# Patient Record
(demographics unavailable — no encounter records)

---

## 2025-01-21 NOTE — HISTORY OF PRESENT ILLNESS
[Patient reported PAP Smear was normal] : Patient reported PAP Smear was normal [Y] : Patient is sexually active [N] : Patient denies prior pregnancies [Currently Active] : currently active [Men] : men [No] : No [Yes] : Yes [FreeTextEntry1] : 29 y/o G0 presenting for pelvic pain and irregular bleeding. Pt has mirena IUD that was placed 2021. Reports initially had no sx but in last year has started to have cramping and bleeding and in last week has had pain with any movement of pelvis. Reports is having spotting between cycles and also has longer cycles than she previously had.   GYNHx: Denies STIs/fibroids/cysts/polyps/abnormal paps. Sexually active with men. Feels safe in current relationship. Last Pap 1 yr ago always normal per pt    OBHx: G0 PMH: Acne PSH: [place] tooth removal SocHx: Social alcohol use, Denies drug and tobacco use. Meds: Spironolactone 150 mg qd, topical tretinoin and clindamycin Allergies: NKDA [PapSmeardate] : 2024 [TextBox_102] : IUD [LMPDate] : 01/01/24 [TextBox_6] : 01/01/24 [TextBox_9] : 13 [FreeTextEntry3] : IUD Mirena

## 2025-01-21 NOTE — PHYSICAL EXAM
[Chaperone Present] : A chaperone was present in the examining room during all aspects of the physical examination [[id number]] : A chaperone was present during the pelvic exam. [FreeTextEntry2] : Samanta Dougherty [Appropriately responsive] : appropriately responsive [Alert] : alert [No Acute Distress] : no acute distress [Soft] : soft [Non-tender] : non-tender [Non-distended] : non-distended [Oriented x3] : oriented x3 [FreeTextEntry4] : Clinically well perfused [Labia Majora] : normal [Labia Minora] : normal [Normal] : normal [Uterine Adnexae] : normal [FreeTextEntry5] : IUD strings visualized

## 2025-01-21 NOTE — PROCEDURE
[Pelvic Pain] : pelvic pain [Locate IUD] : locate IUD [Transvaginal Ultrasound] : transvaginal ultrasound [FreeTextEntry4] : Normal sized uterus with IUD at fundus in correct location. No free fluid. Small structure at fundus that could represent small 2 cm fibroid although difficult to visualize.  Right ovary normal size with small <1 cm cyst Left ovary with two small 2 cm simple cysts

## 2025-01-21 NOTE — PLAN
[FreeTextEntry1] : Pt is a 27 yo G0 with IUD in place presenting for eval of pelvic pain and AUB. Source unclear from exam and in office TVUS. - TVUS referrral - hcg  - Vaginitis swab - Sx diary   RTC after US to discuss sx and review imaging results.

## 2025-02-04 NOTE — HISTORY OF PRESENT ILLNESS
[FreeTextEntry1] : Verbal consent given on 02/04/2025 at 08:40 by BRENNAN WHITE. Pt and I are both in [place].   27 yo G0 with IUD in place presenting for follow up of pelvic pain and AUB. At last visit vaginitis swab and hcg both neg. Pt presents to discuss US results today. Would like to keep IUD in place if possible.   Reports AUB sx are improving and pain has completely resolved.  TVUS 1/2025:  PROCEDURE DATE: 01/28/2025 INTERPRETATION: CLINICAL INFORMATION: Pelvic pain, abnormal uterine bleeding.  LMP: 12/31/2024  COMPARISON: None.  TECHNIQUE: Endovaginal and transabdominal pelvic sonogram. Color and Spectral Doppler was performed.  FINDINGS:  Uterus: 7.3 cm x 4.2 cm x 3.3 cm. Anteverted. Within normal limits. Endometrium: 5 mm. Normal thickness and vascularity. Intrauterine device is seen along the posterior endometrium at the upper uterine segment.  Right ovary: 2.8 cm x 1.6 cm x 1.5 cm. Within normal limits. Normal arterial and venous waveforms. Left ovary: 2.8 cm x 1.7 cm x 2.7 cm. 1.7 cm corpus luteal cyst noted. Normal arterial and venous waveforms.  Fluid: None.  IMPRESSION: 1. Intrauterine device is seen within the endometrium of the posterior uterine body. This finding is of uncertain clinical significance. No other abnormalities are identified.

## 2025-02-04 NOTE — PLAN
[FreeTextEntry1] : 29 yo G0 with IUD in place presenting for eval of pelvic pain and AUB, sx resolved since last visit. US report reviewed with pt in detail. Discussed no clear evidence of IUD as source of pain. Given sx are resolved and IUD appears to be correctly positioned, discussed would not recommend removal at this time. Discussed if sx recur pt should keep sx diary and return to clinic to reassess. Pt in agreement with plan. All questions answered.